# Patient Record
Sex: MALE | Race: WHITE | NOT HISPANIC OR LATINO | URBAN - METROPOLITAN AREA
[De-identification: names, ages, dates, MRNs, and addresses within clinical notes are randomized per-mention and may not be internally consistent; named-entity substitution may affect disease eponyms.]

---

## 2022-12-09 ENCOUNTER — OFFICE (OUTPATIENT)
Dept: URBAN - METROPOLITAN AREA CLINIC 12 | Facility: CLINIC | Age: 37
End: 2022-12-09

## 2022-12-09 VITALS
OXYGEN SATURATION: 98 % | HEIGHT: 73 IN | DIASTOLIC BLOOD PRESSURE: 116 MMHG | WEIGHT: 240 LBS | HEART RATE: 103 BPM | SYSTOLIC BLOOD PRESSURE: 179 MMHG

## 2022-12-09 DIAGNOSIS — K76.0 FATTY (CHANGE OF) LIVER, NOT ELSEWHERE CLASSIFIED: ICD-10-CM

## 2022-12-09 DIAGNOSIS — R11.0 NAUSEA: ICD-10-CM

## 2022-12-09 DIAGNOSIS — R10.12 LEFT UPPER QUADRANT PAIN: ICD-10-CM

## 2022-12-09 DIAGNOSIS — R19.5 OTHER FECAL ABNORMALITIES: ICD-10-CM

## 2022-12-09 DIAGNOSIS — K85.90 ACUTE PANCREATITIS WITHOUT NECROSIS OR INFECTION, UNSPECIFIE: ICD-10-CM

## 2022-12-09 DIAGNOSIS — K21.9 GASTRO-ESOPHAGEAL REFLUX DISEASE WITHOUT ESOPHAGITIS: ICD-10-CM

## 2022-12-09 PROCEDURE — 99204 OFFICE O/P NEW MOD 45 MIN: CPT | Performed by: NURSE PRACTITIONER

## 2022-12-09 RX ORDER — PANTOPRAZOLE 40 MG/1
40 TABLET, DELAYED RELEASE ORAL
Qty: 60 | Refills: 4 | Status: ACTIVE

## 2022-12-09 NOTE — SERVICEHPINOTES
Mr. Zelaya is a 37 year old referred for recurrent pancreatitis. He reports an episode of pancreatitis in June 2021, two episodes in June 2022, one episode in August, and twice last month. He went to [facility] on 11/6/22 with intense LUQ and back pain. A CT scan showed acute pancreatitis, reactive duodenitis, and hepatic steatosis. He had a normal Hida scan. Lipase and amylase were elevated. He went back to [facility] on 11/22/22.  An abdominal USG on showed hepatic steatosis. He feels pretty well today and has slight LUQ pain that radiates to his back. He has occasional nausea. He has loose stools depending on what he eats 2-3 times a week but states he has been that way for years. He is currently taking pantoprazole and it is controlling his reflux well. He denies any vomiting, dysphagia, hematochezia, or melena.

## 2022-12-09 NOTE — SERVICENOTES
He has has several episodes of pancreatitis in the past 1.5 years. He stopped drinking alcohol over the summer but continues to smoke. Will check the above labs and stool studies. Will get a MRI to further evaluate. Will continue pantoprazole and see back in 2 months.

## 2022-12-13 LAB
AMYLASE: 63 U/L (ref 31–110)
COMP. METABOLIC PANEL (14): A/G RATIO: 1.9 (ref 1.2–2.2)
COMP. METABOLIC PANEL (14): ALBUMIN: 4.4 G/DL (ref 4–5)
COMP. METABOLIC PANEL (14): ALKALINE PHOSPHATASE: 112 IU/L (ref 44–121)
COMP. METABOLIC PANEL (14): ALT (SGPT): 13 IU/L (ref 0–44)
COMP. METABOLIC PANEL (14): AST (SGOT): 14 IU/L (ref 0–40)
COMP. METABOLIC PANEL (14): BILIRUBIN, TOTAL: 0.4 MG/DL (ref 0–1.2)
COMP. METABOLIC PANEL (14): BUN/CREATININE RATIO: 7 — LOW (ref 9–20)
COMP. METABOLIC PANEL (14): BUN: 6 MG/DL (ref 6–20)
COMP. METABOLIC PANEL (14): CALCIUM: 9.2 MG/DL (ref 8.7–10.2)
COMP. METABOLIC PANEL (14): CARBON DIOXIDE, TOTAL: 24 MMOL/L (ref 20–29)
COMP. METABOLIC PANEL (14): CHLORIDE: 97 MMOL/L (ref 96–106)
COMP. METABOLIC PANEL (14): CREATININE: 0.83 MG/DL (ref 0.76–1.27)
COMP. METABOLIC PANEL (14): EGFR: 117 ML/MIN/1.73 (ref 59–?)
COMP. METABOLIC PANEL (14): GLOBULIN, TOTAL: 2.3 G/DL (ref 1.5–4.5)
COMP. METABOLIC PANEL (14): GLUCOSE: 81 MG/DL (ref 70–99)
COMP. METABOLIC PANEL (14): POTASSIUM: 3.7 MMOL/L (ref 3.5–5.2)
COMP. METABOLIC PANEL (14): PROTEIN, TOTAL: 6.7 G/DL (ref 6–8.5)
COMP. METABOLIC PANEL (14): SODIUM: 141 MMOL/L (ref 134–144)
LIPASE: 26 U/L (ref 13–78)
NASH FIBROSURE: ALPHA 2-MACROGLOBULINS, QN: 132 MG/DL (ref 110–276)
NASH FIBROSURE: ALT (SGPT) P5P: 14 IU/L (ref 0–55)
NASH FIBROSURE: APOLIPOPROTEIN A-1: 133 MG/DL (ref 101–178)
NASH FIBROSURE: AST (SGOT) P5P: 16 IU/L (ref 0–40)
NASH FIBROSURE: BILIRUBIN, TOTAL: 0.3 MG/DL (ref 0–1.2)
NASH FIBROSURE: CHOLESTEROL, TOTAL: 221 MG/DL — HIGH (ref 100–199)
NASH FIBROSURE: COMMENT: (no result)
NASH FIBROSURE: FIBROSIS SCORE: 0.06 (ref 0–0.21)
NASH FIBROSURE: FIBROSIS SCORING: (no result)
NASH FIBROSURE: FIBROSIS STAGE: (no result)
NASH FIBROSURE: GGT: 80 IU/L — HIGH (ref 0–65)
NASH FIBROSURE: GLUCOSE, SERUM: 84 MG/DL (ref 70–99)
NASH FIBROSURE: HAPTOGLOBIN: 342 MG/DL — HIGH (ref 17–317)
NASH FIBROSURE: HEIGHT: 73 IN
NASH FIBROSURE: INTERPRETATIONS: (no result)
NASH FIBROSURE: LIMITATIONS: (no result)
NASH FIBROSURE: NASH GRADE: (no result)
NASH FIBROSURE: NASH SCORE: 0.5 — HIGH (ref 0.25–?)
NASH FIBROSURE: NASH SCORING: (no result)
NASH FIBROSURE: STEATOSIS GRADE: (no result)
NASH FIBROSURE: STEATOSIS GRADING: (no result)
NASH FIBROSURE: STEATOSIS SCORE: 0.76 — HIGH (ref 0–0.3)
NASH FIBROSURE: TRIGLYCERIDES: 512 MG/DL — HIGH (ref 0–149)
NASH FIBROSURE: WEIGHT: 240 LBS

## 2023-02-10 ENCOUNTER — OFFICE (OUTPATIENT)
Dept: URBAN - METROPOLITAN AREA CLINIC 12 | Facility: CLINIC | Age: 38
End: 2023-02-10

## 2023-02-10 VITALS
OXYGEN SATURATION: 98 % | DIASTOLIC BLOOD PRESSURE: 98 MMHG | SYSTOLIC BLOOD PRESSURE: 133 MMHG | HEIGHT: 73 IN | WEIGHT: 224 LBS | HEART RATE: 71 BPM

## 2023-02-10 DIAGNOSIS — E78.1 PURE HYPERGLYCERIDEMIA: ICD-10-CM

## 2023-02-10 DIAGNOSIS — K76.0 FATTY (CHANGE OF) LIVER, NOT ELSEWHERE CLASSIFIED: ICD-10-CM

## 2023-02-10 DIAGNOSIS — K85.90 ACUTE PANCREATITIS WITHOUT NECROSIS OR INFECTION, UNSPECIFIE: ICD-10-CM

## 2023-02-10 DIAGNOSIS — K21.9 GASTRO-ESOPHAGEAL REFLUX DISEASE WITHOUT ESOPHAGITIS: ICD-10-CM

## 2023-02-10 DIAGNOSIS — R10.12 LEFT UPPER QUADRANT PAIN: ICD-10-CM

## 2023-02-10 DIAGNOSIS — R19.5 OTHER FECAL ABNORMALITIES: ICD-10-CM

## 2023-02-10 DIAGNOSIS — R11.0 NAUSEA: ICD-10-CM

## 2023-02-10 PROCEDURE — 99214 OFFICE O/P EST MOD 30 MIN: CPT | Performed by: NURSE PRACTITIONER

## 2023-02-10 RX ORDER — PANTOPRAZOLE 40 MG/1
40 TABLET, DELAYED RELEASE ORAL
Qty: 60 | Refills: 4 | Status: ACTIVE

## 2023-02-10 NOTE — SERVICENOTES
He was admitted to the hospital 3 times the end of December with pancreatitis.   Will check serum IgG subclass 4 as it was not checked at his last visit as ordered.  stool studies were ordered at his last visit but he did not bring his simple back until today.  The cause of his recurrent pancreatitis is unclear.  He did have  a developing pseudocyst in the tail of his pancreas on MRI MRCP last month.  Will continue his pantoprazole and see him back in 2 months or sooner if needed.

## 2023-02-10 NOTE — SERVICEHPINOTES
Mr. Zelaya is a 37 year old here for follow up of recurrent pancreatitis, adnominal pain, gerd, diarrhea. At his first visit, he had 6 episodes of pancreatitis June 2021 to November 2022. He was also found to have hepatic steatosis on an abdominal ultrasound. He was still having slight LUQ pain. He had loose stools depending on what he eats 2-3 times a week for years.   The stool studies were ordered but he did not bring them back until today's visit. He had a fibrosure on 12/9/22  that showed severe fatty liver but normal LFTs. His amylase and lipase were normal.  He had an MRI MRCP of the abdomen on 01/04/2023 that showed a stable pseudocyst his pancreas and an 8 x 11 mm developing pseudocyst within the distal pancreatic tail. No evidence of pancreatic necrosis.  
br In follow up today, he reports he was admitted to [facility] in [place], [place] for  pancreatitis 3 times the last 3 weeks of December.  He continues to have occasional LUQ pain that radiates to his back and nausea.  His reflux continues to be well controlled on pantoprazole. He still has loose stools a few times a week. He is still smoking cigarettes but still abstaining from alcohol.

## 2023-02-14 LAB — IGG, SUBCLASS 4: 23 MG/DL (ref 2–96)

## 2023-02-15 LAB
FECAL FAT, QUALITATIVE: FATS, NEUTRAL: NORMAL
FECAL FAT, QUALITATIVE: FATS, TOTAL: NORMAL
PANCREATIC ELASTASE, FECAL: 379 UG ELAST./G (ref 200–?)

## 2023-04-07 ENCOUNTER — OFFICE (OUTPATIENT)
Dept: URBAN - METROPOLITAN AREA CLINIC 12 | Facility: CLINIC | Age: 38
End: 2023-04-07

## 2023-04-07 VITALS
OXYGEN SATURATION: 98 % | DIASTOLIC BLOOD PRESSURE: 92 MMHG | HEIGHT: 73 IN | SYSTOLIC BLOOD PRESSURE: 133 MMHG | WEIGHT: 222 LBS | HEART RATE: 65 BPM

## 2023-04-07 DIAGNOSIS — R11.0 NAUSEA: ICD-10-CM

## 2023-04-07 DIAGNOSIS — R10.12 LEFT UPPER QUADRANT PAIN: ICD-10-CM

## 2023-04-07 DIAGNOSIS — K86.1 OTHER CHRONIC PANCREATITIS: ICD-10-CM

## 2023-04-07 DIAGNOSIS — K21.9 GASTRO-ESOPHAGEAL REFLUX DISEASE WITHOUT ESOPHAGITIS: ICD-10-CM

## 2023-04-07 DIAGNOSIS — K76.0 FATTY (CHANGE OF) LIVER, NOT ELSEWHERE CLASSIFIED: ICD-10-CM

## 2023-04-07 PROCEDURE — 99214 OFFICE O/P EST MOD 30 MIN: CPT | Performed by: NURSE PRACTITIONER

## 2023-04-07 RX ORDER — SUCRALFATE 1 G/10ML
SUSPENSION ORAL
Qty: 1200 | Refills: 3 | Status: COMPLETED
Start: 2023-04-07 | End: 2024-02-23

## 2023-04-07 RX ORDER — PANTOPRAZOLE 40 MG/1
40 TABLET, DELAYED RELEASE ORAL
Qty: 60 | Refills: 4 | Status: ACTIVE

## 2023-04-07 RX ORDER — ONDANSETRON 8 MG/1
TABLET, ORALLY DISINTEGRATING ORAL
Qty: 30 | Refills: 5 | Status: ACTIVE
Start: 2023-04-07

## 2023-04-07 NOTE — SERVICEHPINOTES
Mr. Zelaya is a 37 year old here for follow up of recurrent pancreatitis, adnominal pain, gerd, diarrhea.  he has a history of her recurrent pancreatitis and had 6 episodes of pancreatitis from June 2021 to November 2022. He was admitted to [facility] in [place], [place] for  pancreatitis 3 times the last 3 weeks of December.  He was also found to have hepatic steatosis on an abdominal ultrasound. He had a fibrosure on 12/9/22  that showed severe fatty liver but normal LFTs. He had an MRI MRCP of the abdomen on 01/04/2023 that showed a stable pseudocyst his pancreas and an 8 x 11 mm developing pseudocyst within the distal pancreatic tail. No evidence of pancreatic necrosis. He had normal stool studies and normal IgG4.  br
br In follow up today, he continues to have occasional LUQ pain that radiates to his back and nausea.  This usually happens if he eats a large meal.  His reflux continues to be well controlled on pantoprazole.  He is having regular bowel movements and occasionally has a loose stool. He is still smoking cigarettes but still abstaining from alcohol.

## 2023-07-21 ENCOUNTER — OFFICE (OUTPATIENT)
Dept: URBAN - METROPOLITAN AREA CLINIC 12 | Facility: CLINIC | Age: 38
End: 2023-07-21

## 2023-07-21 VITALS
HEIGHT: 73 IN | HEART RATE: 68 BPM | OXYGEN SATURATION: 97 % | SYSTOLIC BLOOD PRESSURE: 137 MMHG | DIASTOLIC BLOOD PRESSURE: 104 MMHG | WEIGHT: 232 LBS

## 2023-07-21 DIAGNOSIS — K21.9 GASTRO-ESOPHAGEAL REFLUX DISEASE WITHOUT ESOPHAGITIS: ICD-10-CM

## 2023-07-21 DIAGNOSIS — K85.90 ACUTE PANCREATITIS WITHOUT NECROSIS OR INFECTION, UNSPECIFIE: ICD-10-CM

## 2023-07-21 PROCEDURE — 99214 OFFICE O/P EST MOD 30 MIN: CPT | Performed by: INTERNAL MEDICINE

## 2023-07-21 RX ORDER — ONDANSETRON 8 MG/1
TABLET, ORALLY DISINTEGRATING ORAL
Qty: 30 | Refills: 5 | Status: ACTIVE
Start: 2023-04-07

## 2023-07-21 RX ORDER — PANTOPRAZOLE 40 MG/1
40 TABLET, DELAYED RELEASE ORAL
Qty: 60 | Refills: 4 | Status: ACTIVE

## 2023-08-11 ENCOUNTER — ON CAMPUS - OUTPATIENT (OUTPATIENT)
Dept: URBAN - METROPOLITAN AREA HOSPITAL 131 | Facility: HOSPITAL | Age: 38
End: 2023-08-11
Payer: OTHER GOVERNMENT

## 2023-08-11 DIAGNOSIS — K20.80 OTHER ESOPHAGITIS WITHOUT BLEEDING: ICD-10-CM

## 2023-08-11 DIAGNOSIS — R93.5 ABNORMAL FINDINGS ON DIAGNOSTIC IMAGING OF OTHER ABDOMINAL R: ICD-10-CM

## 2023-08-11 DIAGNOSIS — K86.1 OTHER CHRONIC PANCREATITIS: ICD-10-CM

## 2023-08-11 PROCEDURE — 43237 ENDOSCOPIC US EXAM ESOPH: CPT | Performed by: INTERNAL MEDICINE

## 2024-02-23 ENCOUNTER — OFFICE (OUTPATIENT)
Dept: URBAN - METROPOLITAN AREA CLINIC 12 | Facility: CLINIC | Age: 39
End: 2024-02-23

## 2024-02-23 VITALS
SYSTOLIC BLOOD PRESSURE: 132 MMHG | OXYGEN SATURATION: 96 % | HEART RATE: 76 BPM | WEIGHT: 223 LBS | HEIGHT: 73 IN | DIASTOLIC BLOOD PRESSURE: 90 MMHG

## 2024-02-23 DIAGNOSIS — K85.90 ACUTE PANCREATITIS WITHOUT NECROSIS OR INFECTION, UNSPECIFIE: ICD-10-CM

## 2024-02-23 DIAGNOSIS — K21.9 GASTRO-ESOPHAGEAL REFLUX DISEASE WITHOUT ESOPHAGITIS: ICD-10-CM

## 2024-02-23 PROCEDURE — 99214 OFFICE O/P EST MOD 30 MIN: CPT | Performed by: INTERNAL MEDICINE

## 2024-02-23 RX ORDER — PANCRELIPASE 36000; 180000; 114000 [USP'U]/1; [USP'U]/1; [USP'U]/1
CAPSULE, DELAYED RELEASE PELLETS ORAL
Qty: 180 | Refills: 4 | Status: ACTIVE
Start: 2024-02-23

## 2024-04-10 ENCOUNTER — OFFICE (OUTPATIENT)
Dept: URBAN - METROPOLITAN AREA CLINIC 11 | Facility: CLINIC | Age: 39
End: 2024-04-10

## 2024-04-10 VITALS
OXYGEN SATURATION: 98 % | WEIGHT: 223 LBS | DIASTOLIC BLOOD PRESSURE: 89 MMHG | SYSTOLIC BLOOD PRESSURE: 127 MMHG | HEART RATE: 63 BPM | HEIGHT: 73 IN

## 2024-04-10 DIAGNOSIS — R10.12 LEFT UPPER QUADRANT PAIN: ICD-10-CM

## 2024-04-10 DIAGNOSIS — R19.5 OTHER FECAL ABNORMALITIES: ICD-10-CM

## 2024-04-10 DIAGNOSIS — K21.9 GASTRO-ESOPHAGEAL REFLUX DISEASE WITHOUT ESOPHAGITIS: ICD-10-CM

## 2024-04-10 DIAGNOSIS — K86.1 OTHER CHRONIC PANCREATITIS: ICD-10-CM

## 2024-04-10 PROCEDURE — 99214 OFFICE O/P EST MOD 30 MIN: CPT | Performed by: NURSE PRACTITIONER

## 2024-04-10 RX ORDER — PANTOPRAZOLE 40 MG/1
40 TABLET, DELAYED RELEASE ORAL
Qty: 60 | Refills: 4 | Status: ACTIVE

## 2024-04-10 RX ORDER — PANCRELIPASE 36000; 180000; 114000 [USP'U]/1; [USP'U]/1; [USP'U]/1
CAPSULE, DELAYED RELEASE PELLETS ORAL
Qty: 180 | Refills: 4 | Status: ACTIVE
Start: 2024-02-23

## 2024-04-10 RX ORDER — ONDANSETRON 8 MG/1
TABLET, ORALLY DISINTEGRATING ORAL
Qty: 30 | Refills: 5 | Status: ACTIVE
Start: 2023-04-07

## 2024-04-10 NOTE — SERVICENOTES
he had a another flare-up of his pancreatitis.  Will get records from [facility].   Will check labs and triglycerides today.   Had a long conversation regarding smoking cessation as this is likely contributing to his flare-ups.   Will continue pantoprazole and will send in Zofran as well.  Will send in Creon to the [facility].  Will see back in 3 months or sooner if needed

## 2024-04-10 NOTE — SERVICEHPINOTES
Mr. Zelaya is a 39yo M that presents for follow up of recurrent pancreatitis, abdominal pain, GERD, and diarrhea. He was having diarrhea his last visit and was started on Creon.  he states the samples of Creon worked well and he had normal, formed bowel movements.   [facility] so he never got it filled.   He started having loose stools/ diarrhea when she ran out of samples.   He had a pancreatitis flare up and went to [facility] recently.   He continues to have some epigastric discomfort but does feel better.   He has occasional nausea but denies any vomiting. sergo trotter
sergo An outline of his history can be seen below. He has a history of her recurrent pancreatitis and had 6 episodes of pancreatitis from June 2021 to November 2022. He was admitted to [facility] in [place], [place] for  pancreatitis 3 times the last 3 weeks of December.  He was also found to have hepatic steatosis on an abdominal ultrasound. He had a fibrosure on 12/9/22  that showed severe fatty liver but normal LFTs. He had an MRI MRCP of the abdomen on 01/04/2023 that showed a stable pseudocyst around 4 cm in size near the gastric body and a smaller 8 x 11 mm pseudocyst within the distal pancreatic tail. He had  normal fecal fat and normal fecal pancreatic elastase and normal IgG4.  Triglycerides were checked during a hospitalization and were found to be mildly elevated at 297 but obviously much less than would be expected to cause pancreatitis.
sergo Floyd is last visit he continued to have some mild symptoms but had not had any hospitalizations and was overall doing well.  We did get his endoscopic ultrasound performed in August of 2023 and this demonstrated rather significant changes of chronic pancreatitis but no other acute or significant issues otherwise.  Since her last visit he does say that he has changed his diet to be a more Mediterranean type diet.  He has had 2 episodes of acute pancreatitis since last visit.  He said he went to the ER both times and had imaging.  Currently today he is feeling well.  He occasionally has some diarrhea a has some chronic mild pain that radiates to his back which is controlled with tramadol from his PCP.

## 2024-07-10 ENCOUNTER — OFFICE (OUTPATIENT)
Dept: URBAN - METROPOLITAN AREA CLINIC 11 | Facility: CLINIC | Age: 39
End: 2024-07-10

## 2024-07-10 VITALS
OXYGEN SATURATION: 97 % | HEART RATE: 78 BPM | DIASTOLIC BLOOD PRESSURE: 99 MMHG | HEIGHT: 73 IN | SYSTOLIC BLOOD PRESSURE: 147 MMHG | WEIGHT: 220 LBS

## 2024-07-10 DIAGNOSIS — K21.9 GASTRO-ESOPHAGEAL REFLUX DISEASE WITHOUT ESOPHAGITIS: ICD-10-CM

## 2024-07-10 DIAGNOSIS — R19.5 OTHER FECAL ABNORMALITIES: ICD-10-CM

## 2024-07-10 DIAGNOSIS — K86.1 OTHER CHRONIC PANCREATITIS: ICD-10-CM

## 2024-07-10 PROCEDURE — 99214 OFFICE O/P EST MOD 30 MIN: CPT | Performed by: NURSE PRACTITIONER

## 2024-07-10 RX ORDER — PANTOPRAZOLE 40 MG/1
40 TABLET, DELAYED RELEASE ORAL
Qty: 60 | Refills: 4 | Status: ACTIVE

## 2024-07-10 RX ORDER — PANCRELIPASE 36000; 180000; 114000 [USP'U]/1; [USP'U]/1; [USP'U]/1
CAPSULE, DELAYED RELEASE PELLETS ORAL
Qty: 180 | Refills: 4 | Status: ACTIVE
Start: 2024-02-23

## 2024-07-10 NOTE — SERVICEHPINOTES
Mr. Zelaya is a 38yo M that presents for follow up of recurrent pancreatitis, abdominal pain, GERD, and diarrhea. He has a history of her recurrent pancreatitis and had 6 episodes of pancreatitis from June 2021 to November 2022. He was admitted to [facility] [place] in [place], [place] for  pancreatitis 3 times the last 3 weeks of December.  He was also found to have hepatic steatosis on an abdominal ultrasound. He had a fibrosure on 12/9/22  that showed severe fatty liver but normal LFTs. He had an MRI MRCP of the abdomen on 01/04/2023 that showed a stable pseudocyst around 4 cm in size near the gastric body and a smaller 8 x 11 mm pseudocyst within the distal pancreatic tail. He had  normal fecal fat and normal fecal pancreatic elastase and normal IgG4.  Triglycerides were checked during a hospitalization and were found to be mildly elevated at 297 but obviously much less than would be expected to cause pancreatitis.At last visit, he continued to have some mild symptoms but had not had any hospitalizations and was overall doing well.  We did get his endoscopic ultrasound performed in August of 2023 and this demonstrated rather significant changes of chronic pancreatitis but no other acute or significant issues otherwise.  Since last visit he does say that he has changed his diet to be a more Mediterranean type diet.  He has had 2 episodes of acute pancreatitis since last visit.  He said he went to the ER both times and had imaging.  Currently today he is feeling well.  He occasionally has some diarrhea a has some chronic mild pain that radiates to his back which is controlled with tramadol from his PCP. 
sergo trotter In follow up on 4/10/24, He was having diarrhea his last visit and was started on Creon.  he states the samples of Creon worked well and he had normal, formed bowel movements.   [facility] so he never got it filled.   He started having loose stools/ diarrhea when she ran out of samples.   He had a pancreatitis flare up and went to [facility] [place] recently.   He continues to have some epigastric discomfort but does feel better.   He has occasional nausea but denies any vomiting.
sergo trotter In follow up on 7/10/24,   He has not had any trips to the ER since his last visit 3 months ago.   He does have a flare up and will have epigastric discomfort that radiates to his back.  He did have this last night and almost went to the ER but his symptoms subsided.   He continues on pantoprazole daily but does have some occasional heartburn, reflux, nausea.   He does eat a pretty decent diet and stays away from fried, greasy, fatty foods.   He continues to smoke a pack of cigarettes a day.

## 2024-07-10 NOTE — SERVICENOTES
He has not had any hospital visits since his last office visit which is good.   However, he continues to smoke a pack of cigarettes a day which his mother passed last month so he has been smoking a little more.   I had a very lengthy conversation with him that this is likely contributing to his symptoms and he needed to cut back and try to stop.   Will increase his pantoprazole to twice a day to see if this helps at all.   He will continue Creon as this is working well for his loose stools.   Will check labs today and see back in a few months.